# Patient Record
Sex: MALE
[De-identification: names, ages, dates, MRNs, and addresses within clinical notes are randomized per-mention and may not be internally consistent; named-entity substitution may affect disease eponyms.]

---

## 2018-10-04 ENCOUNTER — HOSPITAL ENCOUNTER (EMERGENCY)
Dept: HOSPITAL 5 - ED | Age: 73
LOS: 1 days | Discharge: HOME | End: 2018-10-05
Payer: COMMERCIAL

## 2018-10-04 VITALS — DIASTOLIC BLOOD PRESSURE: 74 MMHG | SYSTOLIC BLOOD PRESSURE: 121 MMHG

## 2018-10-04 DIAGNOSIS — Z90.49: ICD-10-CM

## 2018-10-04 DIAGNOSIS — Z91.018: ICD-10-CM

## 2018-10-04 DIAGNOSIS — I10: ICD-10-CM

## 2018-10-04 DIAGNOSIS — Z86.73: ICD-10-CM

## 2018-10-04 DIAGNOSIS — Z87.891: ICD-10-CM

## 2018-10-04 DIAGNOSIS — F32.9: ICD-10-CM

## 2018-10-04 DIAGNOSIS — F91.9: ICD-10-CM

## 2018-10-04 DIAGNOSIS — M25.561: Primary | ICD-10-CM

## 2018-10-04 DIAGNOSIS — I48.91: ICD-10-CM

## 2018-10-04 LAB
BASOPHILS # (AUTO): 0 K/MM3 (ref 0–0.1)
BASOPHILS NFR BLD AUTO: 0.6 % (ref 0–1.8)
BUN SERPL-MCNC: 15 MG/DL (ref 9–20)
BUN/CREAT SERPL: 15 %
CALCIUM SERPL-MCNC: 9.2 MG/DL (ref 8.4–10.2)
EOSINOPHIL # BLD AUTO: 0.1 K/MM3 (ref 0–0.4)
EOSINOPHIL NFR BLD AUTO: 1.6 % (ref 0–4.3)
HCT VFR BLD CALC: 35.5 % (ref 35.5–45.6)
HEMOLYSIS INDEX: 0
HGB BLD-MCNC: 11.9 GM/DL (ref 11.8–15.2)
LYMPHOCYTES # BLD AUTO: 1 K/MM3 (ref 1.2–5.4)
LYMPHOCYTES NFR BLD AUTO: 15.6 % (ref 13.4–35)
MCH RBC QN AUTO: 27 PG (ref 28–32)
MCHC RBC AUTO-ENTMCNC: 34 % (ref 32–34)
MCV RBC AUTO: 81 FL (ref 84–94)
MONOCYTES # (AUTO): 0.6 K/MM3 (ref 0–0.8)
MONOCYTES % (AUTO): 8.8 % (ref 0–7.3)
PLATELET # BLD: 252 K/MM3 (ref 140–440)
RBC # BLD AUTO: 4.39 M/MM3 (ref 3.65–5.03)

## 2018-10-04 PROCEDURE — 36415 COLL VENOUS BLD VENIPUNCTURE: CPT

## 2018-10-04 PROCEDURE — 80320 DRUG SCREEN QUANTALCOHOLS: CPT

## 2018-10-04 PROCEDURE — 99284 EMERGENCY DEPT VISIT MOD MDM: CPT

## 2018-10-04 PROCEDURE — 85025 COMPLETE CBC W/AUTO DIFF WBC: CPT

## 2018-10-04 PROCEDURE — 80048 BASIC METABOLIC PNL TOTAL CA: CPT

## 2018-10-04 PROCEDURE — G0480 DRUG TEST DEF 1-7 CLASSES: HCPCS

## 2018-10-04 NOTE — EMERGENCY DEPARTMENT REPORT
HPI





- General


Chief Complaint: Psych


Time Seen by Provider: 10/04/18 20:30





- HPI


HPI: 





The patient is a 73-year-old male with a significant history of chronic right 

knee pain for 10 years, and mood disorder, who presents for evaluation of 

recurrence of right knee pain and mental health.  Per EMS, the patient's 

caregiver reported that she and the patient was involved in a argument 

secondary to the patient attempted to walk to a store to obtain Tylenol for his 

knee pain.  The patient admits to attempting to leave his group home to walk to 

a store to obtain Tylenol to treat his knee pain.  The patient reports 

recurrence of his chronic right knee pain earlier today, consistent with long-

standing pain, currently moderate in severity, aching quality, exacerbated with 

ablation.  The patient denies fever, new trauma or injury to the right knee, 

headache, unexplained weight loss or weight gain, heat or cold intolerance, skin

, hair, or nail changes, neuro deficits, homicidal ideations, suicidal ideations

, or auditory or visual hallucinations.











ED Past Medical Hx





- Past Medical History


Previous Medical History?: Yes


Hx Hypertension: Yes


Hx CVA: Yes (left sided deficits)


Hx Psychiatric Treatment: Yes (depression)


Additional medical history: afib.  torn miniscus





- Surgical History


Past Surgical History?: Yes


Additional Surgical History: tonsilectomy





- Social History


Smoking Status: Former Smoker


Substance Use Type: None





ED Review of Systems


ROS: 


Stated complaint: SI


Other details as noted in HPI








Constitutional: denies: fever


ENT: denies: throat or neck pain


Respiratory: denies: cough, shortness of breath


Cardiovascular: denies: chest pain


Endocrine: denies unexplained weight loss or gain


Gastrointestinal: denies: abdominal pain, nausea


Genitourinary: denies: dysuria


Musculoskeletal: report rt knee pain denies: leg swelling


Skin: denies: rash


Neurological: denies: headache


Hematological/Lymphatic: denies: easy bleeding or easy bruising  


Psych: denies sadness or hopelessness














Physical Exam





- Physical Exam


Vital Signs: 


 Vital Signs











  10/04/18 10/04/18





  14:06 20:12


 


Temperature 97.8 F 98.2 F


 


Pulse Rate 88 77


 


Respiratory 20 18





Rate  


 


Blood Pressure 117/79 


 


Blood Pressure  121/74





[Left]  


 


O2 Sat by Pulse 97 100





Oximetry  











Physical Exam: 








General: well-nourished, well-developed, no acute distress


Head: Normocephalic, atraumatic


Eyes: normal sclera


ENT: Mucous membranes are pink and moist 


Neck: trachea midline, neck supple, No neck stiffness, no cervical adenopathy


Respiratory: Breath sounds equal bilaterally, no wheezing, rales, or rhonchi


Cardio: S1 and S2 present, no murmurs, rubs, gallops, capillary refill is brisk


Abdomen: Normoactive bowel sounds, soft abdomen, no rigidity, no guarding or 

rebound tenderness


Musc: Tenderness to palpation presents to the right knee medial and lateral 

joint lines, no effusion, no swelling, no bruising or color change, no redness 

or warmth, patellar tendon extensor function intact, no sensation motor deficit 

in the right leg distal to the knee, distal pulses intact


Skin: No rash


Neuro: no facial drooping, normal speech, patient alert oriented 3,


Psych: Normal affect, no suicidal or homicidal ideation, normal cognition, 

normal insight








ED Course


 Vital Signs











  10/04/18 10/04/18





  14:06 20:12


 


Temperature 97.8 F 98.2 F


 


Pulse Rate 88 77


 


Respiratory 20 18





Rate  


 


Blood Pressure 117/79 


 


Blood Pressure  121/74





[Left]  


 


O2 Sat by Pulse 97 100





Oximetry  














ED Medical Decision Making





- Lab Data


Result diagrams: 


 10/04/18 14:23





 10/04/18 14:23





- Medical Decision Making





The patient was seen and examined by myself.  The patient is placed on a 

cardiac monitor and continuous pulse ox. On initial evaluation, the patient was 

found to be in no distress.  Labs are obtained. Lab results are grossly 

unremarkable.  The patient is medically clear.  The patient has been, 

cooperative throughout ED course.  As patient has no suicidal or homicidal 

ideations, or hallucinations, there is no concern on my evaluation for acute 

psychosis at risk of harm to himself or others.  The patient is discharged back 

to group home.


Critical care attestation.: 


If time is entered above; I have spent that time in minutes in the direct care 

of this critically ill patient, excluding procedure time.








ED Disposition


Clinical Impression: 


 Chronic pain of right knee, Behavior concern in adult





Disposition: DC-01 TO HOME OR SELFCARE


Is pt being admited?: No


Does the pt Need Aspirin: No


Condition: Stable


Instructions:  Arthralgia (ED), Mood Disorders (ED)


Referrals: 


PRIMARY CARE,MD [Primary Care Provider] - 3-5 Days


Time of Disposition: 22:07

## 2019-02-13 ENCOUNTER — HOSPITAL ENCOUNTER (EMERGENCY)
Dept: HOSPITAL 5 - ED | Age: 74
Discharge: OTHER INSTITUTION | End: 2019-02-13
Payer: MEDICARE

## 2019-02-13 VITALS — DIASTOLIC BLOOD PRESSURE: 81 MMHG | SYSTOLIC BLOOD PRESSURE: 148 MMHG

## 2019-02-13 DIAGNOSIS — Z86.73: ICD-10-CM

## 2019-02-13 DIAGNOSIS — Z79.899: ICD-10-CM

## 2019-02-13 DIAGNOSIS — E46: Primary | ICD-10-CM

## 2019-02-13 DIAGNOSIS — Z87.891: ICD-10-CM

## 2019-02-13 DIAGNOSIS — Z91.018: ICD-10-CM

## 2019-02-13 DIAGNOSIS — I10: ICD-10-CM

## 2019-02-13 DIAGNOSIS — Z90.89: ICD-10-CM

## 2019-02-13 DIAGNOSIS — F32.9: ICD-10-CM

## 2019-02-13 LAB
ALBUMIN SERPL-MCNC: 2.9 G/DL (ref 3.9–5)
ALT SERPL-CCNC: 31 UNITS/L (ref 7–56)
APTT BLD: 37.2 SEC. (ref 24.2–36.6)
BUN SERPL-MCNC: 13 MG/DL (ref 9–20)
BUN/CREAT SERPL: 22 %
CALCIUM SERPL-MCNC: 8.5 MG/DL (ref 8.4–10.2)
HCT VFR BLD CALC: 33.6 % (ref 35.5–45.6)
HEMOLYSIS INDEX: 44
HGB BLD-MCNC: 11.2 GM/DL (ref 11.8–15.2)
INR PPP: 1.22 (ref 0.87–1.13)
MCHC RBC AUTO-ENTMCNC: 33 % (ref 32–34)
MCV RBC AUTO: 87 FL (ref 84–94)
PLATELET # BLD: 245 K/MM3 (ref 140–440)
RBC # BLD AUTO: 3.85 M/MM3 (ref 3.65–5.03)

## 2019-02-13 PROCEDURE — 80053 COMPREHEN METABOLIC PANEL: CPT

## 2019-02-13 PROCEDURE — 99285 EMERGENCY DEPT VISIT HI MDM: CPT

## 2019-02-13 PROCEDURE — 80320 DRUG SCREEN QUANTALCOHOLS: CPT

## 2019-02-13 PROCEDURE — 82550 ASSAY OF CK (CPK): CPT

## 2019-02-13 PROCEDURE — 85027 COMPLETE CBC AUTOMATED: CPT

## 2019-02-13 PROCEDURE — 36415 COLL VENOUS BLD VENIPUNCTURE: CPT

## 2019-02-13 PROCEDURE — 85610 PROTHROMBIN TIME: CPT

## 2019-02-13 PROCEDURE — 71045 X-RAY EXAM CHEST 1 VIEW: CPT

## 2019-02-13 PROCEDURE — 84443 ASSAY THYROID STIM HORMONE: CPT

## 2019-02-13 PROCEDURE — G0480 DRUG TEST DEF 1-7 CLASSES: HCPCS

## 2019-02-13 PROCEDURE — 83735 ASSAY OF MAGNESIUM: CPT

## 2019-02-13 PROCEDURE — 85730 THROMBOPLASTIN TIME PARTIAL: CPT

## 2019-02-13 NOTE — XRAY REPORT
AP CHEST:



HISTORY: Vomiting



AP view of the chest demonstrates a normal mediastinal and

cardiac contour with clear lungs and normal bony and soft tissue

structures.



IMPRESSION:

Unremarkable AP chest.

## 2019-02-13 NOTE — EMERGENCY DEPARTMENT REPORT
ED General Adult HPI





- General


Chief complaint: Medical Clearance


Stated complaint: NOT EATING


Time Seen by Provider: 02/13/19 13:01


Source: patient, EMS (ems notes not available at time of  chart dictation), RN 

notes reviewed, old records reviewed


Limitations: Physical Limitation





- History of Present Illness


Initial comments: 





This is a pleasant 74-year-old gentleman who is not known to this provider 

previously.





His past medical history includes hyperglycemia, hypertension, high cholesterol,

BPH, constipation, debility, homelessness, depression, stroke, schizophrenia, 

questionable paroxysmal atrial fibrillation, currently on systemic 

anticoagulation eliquis.





The patient is sent to the emergency room for evaluation of not eating.





The patient reports that he has had no appetite since July.  He reports that 

when he attempts to eat, his mouth "gets so dry, it feels like I am chewing 

cardboard."  He reports that occasionally he throws up after eating.  He has no 

pain.  He further reports no urinary symptoms, and he reports that he is able to

drink without difficulty.  He has contemplated a feeding tube in the past.





His symptoms are not acutely worsened or different today.


-: month(s)


Consistency: intermittent


Improves with: other


Worsens with: other


Associated Symptoms: loss of appetite, malaise, nausea/vomiting, weakness.  

denies: confusion, chest pain, cough, diaphoresis, fever/chills, headaches, 

rash, seizure, shortness of breath, syncope





- Related Data


                                    Allergies











Allergy/AdvReac Type Severity Reaction Status Date / Time


 


banana Allergy  Unknown Verified 10/04/18 14:12














ED Review of Systems


ROS: 


Stated complaint: NOT EATING


Other details as noted in HPI





Constitutional: denies: fever


Eyes: denies: vision change


ENT: denies: epistaxis


Respiratory: denies: cough


Cardiovascular: denies: chest pain


Gastrointestinal: nausea, vomiting


Genitourinary: denies: dysuria


Musculoskeletal: denies: back pain


Skin: denies: lesions


Neurological: denies: weakness


Psychiatric: denies: anxiety





ED Past Medical Hx





- Past Medical History


Hx Hypertension: Yes


Hx CVA: Yes (left sided deficits)


Hx Psychiatric Treatment: Yes (depression)


Additional medical history: afib.  torn miniscus





- Surgical History


Additional Surgical History: tonsilectomy





- Social History


Smoking Status: Former Smoker


Substance Use Type: None





ED Physical Exam





- General


Limitations: No Limitations


General appearance: alert, in no apparent distress





- Head


Head exam: Present: atraumatic, normocephalic





- Eye


Eye exam: Present: normal appearance, EOMI





- ENT


ENT exam: Present: normal exam, normal orophraynx, mucous membranes moist, 

normal external ear exam





- Neck


Neck exam: Present: normal inspection, full ROM.  Absent: tenderness, 

meningismus





- Respiratory


Respiratory exam: Present: normal lung sounds bilaterally.  Absent: respiratory 

distress





- Cardiovascular


Cardiovascular Exam: Present: regular rate, normal rhythm, normal heart sounds. 

Absent: bradycardia, tachycardia, irregular rhythm, systolic murmur, diastolic 

murmur, rubs, gallop





- GI/Abdominal


GI/Abdominal exam: Present: soft.  Absent: distended, tenderness, guarding, 

rebound, rigid, pulsatile mass





- Rectal


Rectal exam: Present: deferred





- Extremities Exam


Extremities exam: Present: normal inspection, full ROM, other (2+ pulses noted 

in the bilateral upper, lower extremities.  Compartments soft.  No long bony 

tenderness.  The pelvis is stable.).  Absent: pedal edema, joint swelling, calf 

tenderness





- Back Exam


Back exam: Present: normal inspection, full ROM.  Absent: tenderness, CVA 

tenderness (R), paraspinal tenderness, vertebral tenderness





- Neurological Exam


Neurological exam: Present: alert, oriented X3, other (Extraocular movements 

intact.  Tongue midline.  No facial droop.  Facial sensation intact to light 

touch in the V1, V2, V3 distribution bilaterally.  5 and 5 strength in 4 

extremities..  Sensation is intact to light touch in 4 extremities.).  Absent: 

motor sensory deficit





- Psychiatric


Psychiatric exam: Present: normal affect, normal mood





- Skin


Skin exam: Present: warm, dry, intact, normal color.  Absent: rash





ED Course


                                   Vital Signs











  02/13/19





  13:11


 


Temperature 98.1 F


 


Pulse Rate 79


 


Respiratory 16





Rate 


 


Blood Pressure 110/65


 


O2 Sat by Pulse 99





Oximetry 














- Reevaluation(s)


Reevaluation #1: 





02/13/19 14:22


Differential diagnosis, including not limited to: Protein calorie malnutrition, 

failure to thrive, dysphasia





Assessment and plan: 74-year-old gentleman with reported history of eating 

difficulty since July.  This has been going on for 7-8 months.  The patient is 

afebrile with reassuring vital signs.  Laboratory studies indicate mild to 

moderate hypoalbuminemia, consistent with malnutrition.  His remainder of his 

objective laboratory studies, physical exam and vital signs appear to be mostly 

unremarkable.  During my initial history and physical, the patient was able to 

consume arches, without vomiting, without dysphasia, or without obvious aspirati

on.





Since the patient is able to obtain calories from liquids, without obvious 

aspiration or difficulty swallowing, he does not appear to have an acute medical

condition at this time that would require emergent hospitalization or emergent 

intervention.  The patient may continue to consume calories from liquids, and he

can follow up with an outpatient gastroenterologist to discuss possibilities of 

enteral nutrition.





He does not require emergent hospitalization for placement of emergent enteral 

tube at this point in time.








ED Medical Decision Making





- Lab Data


Result diagrams: 


                                 02/13/19 13:13





                                 02/13/19 13:13








                                   Vital Signs











  02/13/19





  13:11


 


Temperature 98.1 F


 


Pulse Rate 79


 


Respiratory 16





Rate 


 


Blood Pressure 110/65


 


O2 Sat by Pulse 99





Oximetry 











                                   Lab Results











  02/13/19 02/13/19 02/13/19 Range/Units





  13:13 13:13 13:13 


 


WBC  6.6    (4.5-11.0)  K/mm3


 


RBC  3.85    (3.65-5.03)  M/mm3


 


Hgb  11.2 L    (11.8-15.2)  gm/dl


 


Hct  33.6 L    (35.5-45.6)  %


 


MCV  87    (84-94)  fl


 


MCH  29    (28-32)  pg


 


MCHC  33    (32-34)  %


 


RDW  18.1 H    (13.2-15.2)  %


 


Plt Count  245    (140-440)  K/mm3


 


PT   16.2 H   (12.2-14.9)  Sec.


 


INR   1.22 H   (0.87-1.13)  


 


APTT   37.2 H   (24.2-36.6)  Sec.


 


Sodium    138  (137-145)  mmol/L


 


Potassium    3.5 L  (3.6-5.0)  mmol/L


 


Chloride    101.0  ()  mmol/L


 


Carbon Dioxide    29  (22-30)  mmol/L


 


Anion Gap    12  mmol/L


 


BUN    13  (9-20)  mg/dL


 


Creatinine    0.6 L  (0.8-1.5)  mg/dL


 


Estimated GFR    > 60  ml/min


 


BUN/Creatinine Ratio    22  %


 


Glucose    120 H  ()  mg/dL


 


Calcium    8.5  (8.4-10.2)  mg/dL


 


Magnesium    2.00  (1.7-2.3)  mg/dL


 


Total Bilirubin    0.90  (0.1-1.2)  mg/dL


 


AST    40  (5-40)  units/L


 


ALT    31  (7-56)  units/L


 


Alkaline Phosphatase    126  ()  units/L


 


Total Creatine Kinase    49 L  ()  units/L


 


Total Protein    6.1 L  (6.3-8.2)  g/dL


 


Albumin    2.9 L  (3.9-5)  g/dL


 


Albumin/Globulin Ratio    0.9  %


 


TSH     (0.270-4.200)  mlU/mL


 


Salicylates     (2.8-20.0)  mg/dL


 


Acetaminophen     (10.0-30.0)  ug/mL














  02/13/19 02/13/19 02/13/19 Range/Units





  13:13 13:33 13:33 


 


WBC     (4.5-11.0)  K/mm3


 


RBC     (3.65-5.03)  M/mm3


 


Hgb     (11.8-15.2)  gm/dl


 


Hct     (35.5-45.6)  %


 


MCV     (84-94)  fl


 


MCH     (28-32)  pg


 


MCHC     (32-34)  %


 


RDW     (13.2-15.2)  %


 


Plt Count     (140-440)  K/mm3


 


PT     (12.2-14.9)  Sec.


 


INR     (0.87-1.13)  


 


APTT     (24.2-36.6)  Sec.


 


Sodium     (137-145)  mmol/L


 


Potassium     (3.6-5.0)  mmol/L


 


Chloride     ()  mmol/L


 


Carbon Dioxide     (22-30)  mmol/L


 


Anion Gap     mmol/L


 


BUN     (9-20)  mg/dL


 


Creatinine     (0.8-1.5)  mg/dL


 


Estimated GFR     ml/min


 


BUN/Creatinine Ratio     %


 


Glucose     ()  mg/dL


 


Calcium     (8.4-10.2)  mg/dL


 


Magnesium     (1.7-2.3)  mg/dL


 


Total Bilirubin     (0.1-1.2)  mg/dL


 


AST     (5-40)  units/L


 


ALT     (7-56)  units/L


 


Alkaline Phosphatase     ()  units/L


 


Total Creatine Kinase     ()  units/L


 


Total Protein     (6.3-8.2)  g/dL


 


Albumin     (3.9-5)  g/dL


 


Albumin/Globulin Ratio     %


 


TSH  2.390    (0.270-4.200)  mlU/mL


 


Salicylates   < 0.3 L   (2.8-20.0)  mg/dL


 


Acetaminophen    < 5.0 L  (10.0-30.0)  ug/mL














- Radiology Data


Radiology results: report reviewed, image reviewed





xr chest negative


Critical care attestation.: 


If time is entered above; I have spent that time in minutes in the direct care 

of this critically ill patient, excluding procedure time.








ED Disposition


Clinical Impression: 


Protein calorie malnutrition


Qualifiers:


 Protein-calorie malnutrition severity: unspecified severity Qualified Code(s): 

E46 - Unspecified protein-calorie malnutrition





Disposition: DC/TX-70 ANOTHER TYPE HLTHCARE


Is pt being admited?: No


Does the pt Need Aspirin: No


Condition: Stable


Instructions:  Chronic Dysphagia (ED)


Additional Instructions: 


Patient may continue to obtain calories from liquids and oral nutrition.  

Advance diet as tolerated.  Continue current outpatient medications.  Follow up 

with a gastroenterologist within the next 2 weeks to discuss long-term feeding 

options, such as enteral nutrition.





The patient was noted to be tolerating R Suess in the emergency room, and an 

unremarkable laboratory studies, with the exception of mild decreased albumin.  

The patient does not appear to have an emergent medical condition at this time, 

and should follow up with an outpatient gastroenterologist for definitive 

feeding options.








Please return to the emergency room right away with new, worsening or different 

symptoms.  Avoid consumption of Motrin, ibuprofen, Naprosyn, Aleve.


Referrals: 


[facility] [Provider Group] - 7-10 days